# Patient Record
Sex: FEMALE | Race: WHITE | NOT HISPANIC OR LATINO | URBAN - METROPOLITAN AREA
[De-identification: names, ages, dates, MRNs, and addresses within clinical notes are randomized per-mention and may not be internally consistent; named-entity substitution may affect disease eponyms.]

---

## 2024-12-17 ENCOUNTER — APPOINTMENT (OUTPATIENT)
Dept: PRIMARY CARE | Facility: CLINIC | Age: 29
End: 2024-12-17

## 2024-12-17 VITALS
HEIGHT: 66 IN | WEIGHT: 184.2 LBS | DIASTOLIC BLOOD PRESSURE: 80 MMHG | RESPIRATION RATE: 16 BRPM | SYSTOLIC BLOOD PRESSURE: 110 MMHG | HEART RATE: 75 BPM | OXYGEN SATURATION: 98 % | BODY MASS INDEX: 29.6 KG/M2 | TEMPERATURE: 98.1 F

## 2024-12-17 DIAGNOSIS — Z00.00 ROUTINE ADULT HEALTH MAINTENANCE: ICD-10-CM

## 2024-12-17 DIAGNOSIS — E34.9 HORMONE IMBALANCE: ICD-10-CM

## 2024-12-17 DIAGNOSIS — E55.9 VITAMIN D DEFICIENCY: ICD-10-CM

## 2024-12-17 DIAGNOSIS — Z76.89 ENCOUNTER FOR WEIGHT MANAGEMENT: ICD-10-CM

## 2024-12-17 DIAGNOSIS — R53.83 FATIGUE, UNSPECIFIED TYPE: Primary | ICD-10-CM

## 2024-12-17 DIAGNOSIS — R63.4 WEIGHT LOSS: ICD-10-CM

## 2024-12-17 PROCEDURE — 99203 OFFICE O/P NEW LOW 30 MIN: CPT | Performed by: FAMILY MEDICINE

## 2024-12-17 RX ORDER — DOXYCYCLINE 100 MG/1
100 CAPSULE ORAL 2 TIMES DAILY
COMMUNITY

## 2024-12-17 ASSESSMENT — ENCOUNTER SYMPTOMS
PHOTOPHOBIA: 0
SEIZURES: 0
RECTAL PAIN: 0
DIARRHEA: 0
SPEECH DIFFICULTY: 0
FLANK PAIN: 0
ACTIVITY CHANGE: 0
EYE PAIN: 0
ABDOMINAL DISTENTION: 0
POLYPHAGIA: 0
TROUBLE SWALLOWING: 0
RHINORRHEA: 0
SORE THROAT: 0
STRIDOR: 0
SINUS PAIN: 0
SINUS PRESSURE: 0
MYALGIAS: 0
APPETITE CHANGE: 0
SLEEP DISTURBANCE: 0
DIZZINESS: 0
DECREASED CONCENTRATION: 0
ADENOPATHY: 0
PALPITATIONS: 0
CONFUSION: 0
AGITATION: 0
COUGH: 0
FATIGUE: 0
DYSPHORIC MOOD: 0
NERVOUS/ANXIOUS: 0
NECK STIFFNESS: 0
HEMATURIA: 0
FEVER: 0
SHORTNESS OF BREATH: 0
ABDOMINAL PAIN: 0
POLYDIPSIA: 0
CHEST TIGHTNESS: 0
DYSURIA: 0
BLOOD IN STOOL: 0
COLOR CHANGE: 0
HEADACHES: 0
CONSTITUTIONAL NEGATIVE: 1
ARTHRALGIAS: 0
CONSTIPATION: 0

## 2024-12-17 NOTE — PROGRESS NOTES
Subjective   Patient ID: Yumiko May is a 29 y.o. female who presents for Establish Care and Weight Management.    Patient presents in office with a complaint of weight management. Patient would like to discuss gaining weight. Patient admits she has recently discontinued her birth control. Patient denies any changes with food consumption. Patient admits to working out regularly. Patient has not been treated for weight loss.        Patient menstrual cycle has been irregular since stopping birth control   Review of Systems   Constitutional: Negative.  Negative for activity change, appetite change, fatigue and fever.   HENT:  Negative for congestion, dental problem, ear discharge, ear pain, mouth sores, rhinorrhea, sinus pressure, sinus pain, sore throat, tinnitus and trouble swallowing.    Eyes:  Negative for photophobia, pain and visual disturbance.   Respiratory:  Negative for cough, chest tightness, shortness of breath and stridor.    Cardiovascular:  Negative for chest pain and palpitations.   Gastrointestinal:  Negative for abdominal distention, abdominal pain, blood in stool, constipation, diarrhea and rectal pain.   Endocrine: Negative for cold intolerance, heat intolerance, polydipsia, polyphagia and polyuria.   Genitourinary:  Negative for dysuria, flank pain, hematuria and urgency.   Musculoskeletal:  Negative for arthralgias, gait problem, myalgias and neck stiffness.   Skin:  Negative for color change and rash.   Allergic/Immunologic: Negative for environmental allergies and food allergies.   Neurological:  Negative for dizziness, seizures, syncope, speech difficulty and headaches.   Hematological:  Negative for adenopathy.   Psychiatric/Behavioral:  Negative for agitation, confusion, decreased concentration, dysphoric mood and sleep disturbance. The patient is not nervous/anxious.        Objective   /80 (BP Location: Left arm, Patient Position: Sitting, BP Cuff Size: Adult)   Pulse 75   Temp 36.7  "°C (98.1 °F) (Temporal)   Resp 16   Ht 1.676 m (5' 6\")   Wt 83.6 kg (184 lb 3.2 oz)   SpO2 98%   BMI 29.73 kg/m²     Physical Exam  Vitals reviewed.   Constitutional:       General: She is not in acute distress.     Appearance: Normal appearance. She is obese. She is not ill-appearing or diaphoretic.   HENT:      Head: Normocephalic.      Right Ear: Tympanic membrane and external ear normal.      Left Ear: Tympanic membrane and external ear normal.      Nose: Nose normal. No congestion.      Mouth/Throat:      Pharynx: No posterior oropharyngeal erythema.   Eyes:      General:         Right eye: No discharge.         Left eye: No discharge.      Extraocular Movements: Extraocular movements intact.      Conjunctiva/sclera: Conjunctivae normal.      Pupils: Pupils are equal, round, and reactive to light.   Cardiovascular:      Rate and Rhythm: Normal rate and regular rhythm.      Pulses: Normal pulses.      Heart sounds: Normal heart sounds. No murmur heard.  Pulmonary:      Effort: Pulmonary effort is normal. No respiratory distress.      Breath sounds: Normal breath sounds. No wheezing or rales.   Chest:      Chest wall: No tenderness.   Abdominal:      General: Abdomen is flat. Bowel sounds are normal. There is no distension.      Palpations: There is no mass.      Tenderness: There is no abdominal tenderness. There is no guarding.   Musculoskeletal:         General: No tenderness. Normal range of motion.      Cervical back: Normal range of motion and neck supple. No tenderness.      Right lower leg: No edema.      Left lower leg: No edema.   Skin:     General: Skin is dry.      Coloration: Skin is not jaundiced.      Findings: No bruising, erythema or rash.   Neurological:      General: No focal deficit present.      Mental Status: She is alert and oriented to person, place, and time. Mental status is at baseline.      Cranial Nerves: No cranial nerve deficit.      Sensory: No sensory deficit.      " Coordination: Coordination normal.      Gait: Gait normal.   Psychiatric:         Mood and Affect: Mood normal.         Thought Content: Thought content normal.         Judgment: Judgment normal.         Assessment/Plan   Problem List Items Addressed This Visit             ICD-10-CM    Encounter for weight management Z76.89    Relevant Medications    tirzepatide, weight loss, (Zepbound) 2.5 mg/0.5 mL injection    tirzepatide, weight loss, (Zepbound) 5 mg/0.5 mL injection    Hormone imbalance E34.9     Other Visit Diagnoses         Codes    Fatigue, unspecified type    -  Primary R53.83    Relevant Orders    Thyroid Stimulating Hormone    Thyroxine, Free    Thyroxine, Total    Vitamin D deficiency     E55.9    Relevant Orders    Vitamin D 25-Hydroxy,Total (for eval of Vitamin D levels)    Routine adult health maintenance     Z00.00    Relevant Orders    Comprehensive Metabolic Panel    Lipid Panel    CBC and Auto Differential    Estrogens, Total    Weight loss     R63.4    Relevant Medications    tirzepatide, weight loss, (Zepbound) 2.5 mg/0.5 mL injection    tirzepatide, weight loss, (Zepbound) 5 mg/0.5 mL injection          Start Zepbound 2.5 mg for the next four weeks then 5 mg the next 4 weeks.     Blood work ordered      Scribe Attestation  By signing my name below, I, ELIZABETH Dc , Scribe   attest that this documentation has been prepared under the direction and in the presence of Adrian Gann DO.     All medical record entries made by the Scribe were at my direction and personally dictated by me. I have reviewed the chart and agree that the record accurately reflects my personal performance of the history, physical exam, discussion and plan.